# Patient Record
Sex: FEMALE | Race: WHITE
[De-identification: names, ages, dates, MRNs, and addresses within clinical notes are randomized per-mention and may not be internally consistent; named-entity substitution may affect disease eponyms.]

---

## 2018-09-25 ENCOUNTER — HOSPITAL ENCOUNTER (INPATIENT)
Dept: HOSPITAL 88 - ER | Age: 83
LOS: 3 days | Discharge: NURSING, REHAB OR LONG-TERM CARE | DRG: 65 | End: 2018-09-28
Attending: INTERNAL MEDICINE | Admitting: INTERNAL MEDICINE
Payer: MEDICARE

## 2018-09-25 VITALS — SYSTOLIC BLOOD PRESSURE: 160 MMHG | DIASTOLIC BLOOD PRESSURE: 75 MMHG

## 2018-09-25 VITALS — SYSTOLIC BLOOD PRESSURE: 163 MMHG | DIASTOLIC BLOOD PRESSURE: 94 MMHG

## 2018-09-25 VITALS — BODY MASS INDEX: 28.89 KG/M2 | WEIGHT: 143.31 LBS | HEIGHT: 59 IN

## 2018-09-25 DIAGNOSIS — I10: ICD-10-CM

## 2018-09-25 DIAGNOSIS — F32.9: ICD-10-CM

## 2018-09-25 DIAGNOSIS — G43.909: ICD-10-CM

## 2018-09-25 DIAGNOSIS — M19.90: ICD-10-CM

## 2018-09-25 DIAGNOSIS — E78.5: ICD-10-CM

## 2018-09-25 DIAGNOSIS — G81.94: ICD-10-CM

## 2018-09-25 DIAGNOSIS — Z88.0: ICD-10-CM

## 2018-09-25 DIAGNOSIS — F41.9: ICD-10-CM

## 2018-09-25 DIAGNOSIS — N31.9: ICD-10-CM

## 2018-09-25 DIAGNOSIS — I63.9: Primary | ICD-10-CM

## 2018-09-25 LAB
ALBUMIN SERPL-MCNC: 3.9 G/DL (ref 3.5–5)
ALBUMIN/GLOB SERPL: 1.4 {RATIO} (ref 0.8–2)
ALP SERPL-CCNC: 57 IU/L (ref 40–150)
ALT SERPL-CCNC: 13 IU/L (ref 0–55)
AMPHETAMINES UR QL SCN>1000 NG/ML: NEGATIVE
ANION GAP SERPL CALC-SCNC: 14.6 MMOL/L (ref 8–16)
BACTERIA URNS QL MICRO: (no result) /HPF
BASOPHILS # BLD AUTO: 0 10*3/UL (ref 0–0.1)
BASOPHILS NFR BLD AUTO: 0.5 % (ref 0–1)
BENZODIAZ UR QL SCN: POSITIVE
BILIRUB UR QL: NEGATIVE
BUN SERPL-MCNC: 20 MG/DL (ref 7–26)
BUN/CREAT SERPL: 26 (ref 6–25)
CALCIUM SERPL-MCNC: 9.4 MG/DL (ref 8.4–10.2)
CHLORIDE SERPL-SCNC: 105 MMOL/L (ref 98–107)
CK MB SERPL-MCNC: 2.4 NG/ML (ref 0–5)
CK SERPL-CCNC: 70 IU/L (ref 29–168)
CLARITY UR: CLEAR
CO2 SERPL-SCNC: 26 MMOL/L (ref 22–29)
COLOR UR: YELLOW
DEPRECATED APTT PLAS QN: 24.3 SECONDS (ref 23.8–35.5)
DEPRECATED INR PLAS: 0.9
DEPRECATED NEUTROPHILS # BLD AUTO: 4.7 10*3/UL (ref 2.1–6.9)
DEPRECATED RBC URNS MANUAL-ACNC: (no result) /HPF (ref 0–5)
EGFRCR SERPLBLD CKD-EPI 2021: > 60 ML/MIN (ref 60–?)
EOSINOPHIL # BLD AUTO: 0.1 10*3/UL (ref 0–0.4)
EOSINOPHIL NFR BLD AUTO: 1.4 % (ref 0–6)
EPI CELLS URNS QL MICRO: (no result) /LPF
ERYTHROCYTE [DISTWIDTH] IN CORD BLOOD: 13.2 % (ref 11.7–14.4)
GLOBULIN PLAS-MCNC: 2.7 G/DL (ref 2.3–3.5)
GLUCOSE SERPLBLD-MCNC: 105 MG/DL (ref 74–118)
HCT VFR BLD AUTO: 39.8 % (ref 34.2–44.1)
HGB BLD-MCNC: 13.4 G/DL (ref 12–16)
KETONES UR QL STRIP.AUTO: NEGATIVE
LEUKOCYTE ESTERASE UR QL STRIP.AUTO: (no result)
LYMPHOCYTES # BLD: 2.4 10*3/UL (ref 1–3.2)
LYMPHOCYTES NFR BLD AUTO: 30.3 % (ref 18–39.1)
MCH RBC QN AUTO: 30.9 PG (ref 28–32)
MCHC RBC AUTO-ENTMCNC: 33.7 G/DL (ref 31–35)
MCV RBC AUTO: 91.7 FL (ref 81–99)
MONOCYTES # BLD AUTO: 0.7 10*3/UL (ref 0.2–0.8)
MONOCYTES NFR BLD AUTO: 8.7 % (ref 4.4–11.3)
NEUTS SEG NFR BLD AUTO: 58.8 % (ref 38.7–80)
NITRITE UR QL STRIP.AUTO: NEGATIVE
NON-SQ EPI CELLS URNS QL MICRO: (no result)
PCP UR QL SCN: NEGATIVE
PLATELET # BLD AUTO: 227 X10E3/UL (ref 140–360)
POTASSIUM SERPL-SCNC: 3.6 MMOL/L (ref 3.5–5.1)
PROT UR QL STRIP.AUTO: NEGATIVE
PROTHROMBIN TIME: 13 SECONDS (ref 11.9–14.5)
RBC # BLD AUTO: 4.34 X10E6/UL (ref 3.6–5.1)
SODIUM SERPL-SCNC: 142 MMOL/L (ref 136–145)
SP GR UR STRIP: 1 (ref 1.01–1.02)
UROBILINOGEN UR STRIP-MCNC: 0.2 MG/DL (ref 0.2–1)
WBC #/AREA URNS HPF: (no result) /HPF (ref 0–5)

## 2018-09-25 PROCEDURE — 70544 MR ANGIOGRAPHY HEAD W/O DYE: CPT

## 2018-09-25 PROCEDURE — 82553 CREATINE MB FRACTION: CPT

## 2018-09-25 PROCEDURE — 80307 DRUG TEST PRSMV CHEM ANLYZR: CPT

## 2018-09-25 PROCEDURE — 70450 CT HEAD/BRAIN W/O DYE: CPT

## 2018-09-25 PROCEDURE — 92523 SPEECH SOUND LANG COMPREHEN: CPT

## 2018-09-25 PROCEDURE — 73521 X-RAY EXAM HIPS BI 2 VIEWS: CPT

## 2018-09-25 PROCEDURE — 93005 ELECTROCARDIOGRAM TRACING: CPT

## 2018-09-25 PROCEDURE — 85610 PROTHROMBIN TIME: CPT

## 2018-09-25 PROCEDURE — 36415 COLL VENOUS BLD VENIPUNCTURE: CPT

## 2018-09-25 PROCEDURE — 93880 EXTRACRANIAL BILAT STUDY: CPT

## 2018-09-25 PROCEDURE — 93306 TTE W/DOPPLER COMPLETE: CPT

## 2018-09-25 PROCEDURE — 70551 MRI BRAIN STEM W/O DYE: CPT

## 2018-09-25 PROCEDURE — 85025 COMPLETE CBC W/AUTO DIFF WBC: CPT

## 2018-09-25 PROCEDURE — 80061 LIPID PANEL: CPT

## 2018-09-25 PROCEDURE — 72100 X-RAY EXAM L-S SPINE 2/3 VWS: CPT

## 2018-09-25 PROCEDURE — 84484 ASSAY OF TROPONIN QUANT: CPT

## 2018-09-25 PROCEDURE — 81001 URINALYSIS AUTO W/SCOPE: CPT

## 2018-09-25 PROCEDURE — 85730 THROMBOPLASTIN TIME PARTIAL: CPT

## 2018-09-25 PROCEDURE — 83036 HEMOGLOBIN GLYCOSYLATED A1C: CPT

## 2018-09-25 PROCEDURE — 99284 EMERGENCY DEPT VISIT MOD MDM: CPT

## 2018-09-25 PROCEDURE — 80053 COMPREHEN METABOLIC PANEL: CPT

## 2018-09-25 PROCEDURE — 82550 ASSAY OF CK (CPK): CPT

## 2018-09-25 NOTE — DIAGNOSTIC IMAGING REPORT
History:Weakness, confusion. TIA

Comparison studies:None



Technique:

Axial images were obtained from the skull base to the vertex.

Coronal and sagittal images reconstructed from the axial data.

Intravenous contrast: None

Dose modulation, iterative reconstruction, and/or weight based adjustment of

the mA/kV was utilized to reduce the radiation dose to as low as reasonably

achievable.



Findings:



Scalp/skull: 

No abnormalities.



Extra-axial spaces: 

No masses.  No fluid collections.



Brain sulci: Mildly prominent.

Ventricles: Mild compensatory dilatation. No hydrocephalus.



Parenchyma: 

Scattered small hypodensities in the supratentorial white matter are small

vessel ischemic changes. No masses, hemorrhage, acute or chronic cortical

vascular insults.



Sellar/suprasellar region: No abnormalities.

Craniocervical junction: Patent foramen magnum.  No Chiari one malformation.



Incidental findings:

Atherosclerotic calcifications in the carotid siphons and vertebral artery .



Impression:



No acute abnormalities.



Chronic findings:

1.  Mild generalized volume loss.

2.  Mild supratentorial white matter small vessel ischemic changes.



Signed by: DR Satya Torres M.D. on 9/25/2018 3:49 PM

## 2018-09-26 VITALS — DIASTOLIC BLOOD PRESSURE: 81 MMHG | SYSTOLIC BLOOD PRESSURE: 129 MMHG

## 2018-09-26 VITALS — SYSTOLIC BLOOD PRESSURE: 155 MMHG | DIASTOLIC BLOOD PRESSURE: 73 MMHG

## 2018-09-26 VITALS — SYSTOLIC BLOOD PRESSURE: 160 MMHG | DIASTOLIC BLOOD PRESSURE: 75 MMHG

## 2018-09-26 VITALS — SYSTOLIC BLOOD PRESSURE: 162 MMHG | DIASTOLIC BLOOD PRESSURE: 78 MMHG

## 2018-09-26 VITALS — DIASTOLIC BLOOD PRESSURE: 88 MMHG | SYSTOLIC BLOOD PRESSURE: 145 MMHG

## 2018-09-26 VITALS — SYSTOLIC BLOOD PRESSURE: 129 MMHG | DIASTOLIC BLOOD PRESSURE: 81 MMHG

## 2018-09-26 VITALS — DIASTOLIC BLOOD PRESSURE: 70 MMHG | SYSTOLIC BLOOD PRESSURE: 156 MMHG

## 2018-09-26 VITALS — SYSTOLIC BLOOD PRESSURE: 187 MMHG | DIASTOLIC BLOOD PRESSURE: 85 MMHG

## 2018-09-26 LAB
CHOLEST SERPL-MCNC: 208 MD/DL (ref 0–199)
CHOLEST/HDLC SERPL: 3.4 {RATIO} (ref 3–3.6)
HDLC SERPL-MSCNC: 62 MG/DL (ref 40–60)
LDLC SERPL CALC-MCNC: 123 MG/DL (ref 60–130)
TRIGL SERPL-MCNC: 114 MG/DL (ref 0–149)

## 2018-09-26 RX ADMIN — VERAPAMIL HYDROCHLORIDE SCH MG: 120 TABLET, FILM COATED, EXTENDED RELEASE ORAL at 14:36

## 2018-09-26 RX ADMIN — FAMOTIDINE SCH MG: 20 TABLET, FILM COATED ORAL at 16:54

## 2018-09-26 RX ADMIN — Medication SCH MG: at 08:58

## 2018-09-26 RX ADMIN — TOLTERODINE TARTRATE SCH MG: 4 CAPSULE, EXTENDED RELEASE ORAL at 11:23

## 2018-09-26 RX ADMIN — ENOXAPARIN SODIUM SCH MG: 40 INJECTION SUBCUTANEOUS at 16:54

## 2018-09-26 NOTE — DIAGNOSTIC IMAGING REPORT
Exams: Brain MRI and intracranial MRA without IV contrast

History: Evaluate for stroke, weakness, facial droop, difficulty standing.

Comparison studies: Head CT 9/20 5/20/2018



Technique:

Brain: Axial DWI, axial and coronal T2 FLAIR, axial T1 FLAIR, axial and

sagittal T2 FS, and axial T2*GRE.

Intracranial MRA: Axial 3-D time-of-flight with coronal, sagittal and 3-D MIP

reformats.

Intravenous contrast: None



Findings:



Brain:



Scalp: No abnormal signal.  No masses.

Bone marrow: Normal in signal intensity.



Brain sulci: Mildly prominent.

Ventricles: Mild compensatory dilatation.

Extra axial spaces: No mass, no fluid collection.



Parenchyma:

Acute, nonhemorrhagic ischemic insult with linear restricted diffusion

associated T2 FLAIR hyperintensity extends from the posterior right putamen to

the posterior limb of the right internal capsule thalamocapsular region.

Scattered and mildly confluent T2 FLAIR hyperintense signal changes in the

supratentorial white matter are nonspecific most compatible with chronic

microvascular ischemic changes.



Suprasellar region: No abnormalities.

Craniocervical junction: No abnormalities. The foramen magnum is patent. No

Chiari malformations.

Vessels: Normal flow-voids in the arteries and sinuses.

Incidental findings: Bilateral lateral lens replacement related previous

cataract surgery.



Intracranial MRA:



Anterior circulation: 

Patent bilateral internal carotid arteries with calcified after cirrhosis in

the carotid siphons without significant stenosis. Patent, no flow abnormalities

in the bilateral M1 and proximal M2 segments of the middle vertebral arteries

and A1 and proximal A2 segments of the anterior cerebral arteries.



Vertebrobasilar circulation:

Patent, no flow abnormalities in the included intradural V4 segments of the

vertebral arteries, basilar artery and posterior cerebral arteries. The right

P1 segment is hypoplastic and the dominant supply to the right PCA is via a

patent right posterior commuting artery (persistent fetal origin. Slightly

greater flow signal in the left posterior cervical artery compared to the

right.



Anatomical variants:

Acom: Patent.

Pcom: Persistent right PCA fetal origin. Hypoplastic left.

Vertebral arteries:Codominant.



IMPRESSION:



Brain MRI:

1.  Small acute nonhemorrhagic ischemic insult extends from the posterior right

putamen to the posterior limb of the right internal capsule.

2.  Moderate chronic microvascular ischemic changes.

3.  Mild generalized volume loss.



Intracranial MRA:

1.  No major branch occlusion or hemodynamically significant stenosis in the

[place].

2.  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

3.  Anatomical variant right PCA fetal origin with slightly greater flow signal

in the contralateral left PCA compared to the right.



Findings discussed with Nurse Sheets at the 12:27 PM on 9/26/2018.

    



Signed by: Dr. Tre Duval M.D. on 9/26/2018 12:28 PM

## 2018-09-26 NOTE — HISTORY AND PHYSICAL
CHIEF COMPLAINT:  Possible TIA and possible ischemic stroke.



HISTORY:  An 87-year-old female 2 days ago had some slurred speech that 

completely resolved. She came to my office subsequently within 24 hours, 

and the patient is asymptomatic.  She was ordered multiple tests.  Imaging 

done and also gave the patient baby aspirin.  However, yesterday the 

patient on the day of presentation she complained of some left-sided 

weakness to the lower extremities and some slurred speech.  The patient was 

immediately brought into the emergency room.  Currently, the patient is 

stable.  She had no slurred speech.  She has some weakness on the left 

upper extremity, but she is able to walk.  The patient is otherwise stable 

at this time.  Neurology consultation obtained.  CT scan of the brain 

showed no acute bleed or finding.  The patient is pending for further 

evaluation.



PAST MEDICAL HISTORY:  Depression, anxiety, osteoarthritis, hypertension.



PAST SURGICAL HISTORY:  Noncontributory.



SOCIAL HISTORY:  The patient lives at home by herself.  She is very 

functional.



ALLERGIES:  CODEINE AND PENICILLIN.  



HOME MEDICATIONS:  Celebrex, Detrol LA and verapamil.



REVIEW OF SYSTEMS:  Left-sided upper extremity weakness. 



PHYSICAL EXAMINATION

VITAL SIGNS:  Temperature is 97, blood pressure 187/85, pulse rate is 77, 

respirations 18. 

GENERAL:  The patient is not in acute distress. 

HEENT:  Normocephalic, atraumatic and anicteric. 

NECK:  Supple grossly.  

PULMONARY:  Diminished breath sounds. 

CARDIOVASCULAR:  S1 and S2.  Regular rate and rhythm. 

ABDOMEN:  Soft.  

EXTREMITIES:  No gross cyanosis or edema. 

NEUROLOGIC:  There is some left-sided weakness in the left upper extremity. 

 There is some slight weakness on the left lower extremity.  Speech is 

normal. 



LABORATORY:  Sodium 142, potassium 3.6, chloride 105, bicarb 26, BUN 20, 

creatinine 0.7, glucose 105.  WBC 7.9, hemoglobin 13.4, hematocrit 39.8, 

and platelets is 227,000.



IMPRESSION 

1.  Most likely ischemic cerebrovascular accident.

2.  Elevated blood pressure, but will maintain blood pressure elevation.



PLAN:  Continue with aspirin and add on Lovenox.  Keep blood pressure 

permissive elevation.  Consultation with Dr. Taylor Estevez.  MRI and MRA 

of the brain today.  Carotid Doppler.









DD:  09/26/2018 09:17

DT:  09/26/2018 09:42

Job#:  [id number]

## 2018-09-26 NOTE — CONSULTATION
DATE OF CONSULTATION:  2018 



NEUROLOGY CONSULT



HISTORY OF PRESENT ILLNESS:  Ms. Mahmood is an 87-year-old right-hand 

dominant woman with a past medical history significant for hypertension 

admitted to [facility] on 2018, with symptoms 

suspicious for a stroke.



On the evening of 2018, the patient was noted to have slurred 

speech and left facial droop by her children.  The time of onset and 

duration of these symptoms is unknown.  Ms. Mahmood saw her primary care 

physician, Dr. Mondragon, on 2018.  After describing her symptoms 

to Dr. Mondragon, the patient was informed she probably had experienced a 

transient ischemic attack.  Dr. Mondragon ordered several diagnostic studies for 

evaluation, all to be performed as an outpatient.



On the morning of 2018, the patient awoke with slurred 

speech, left facial droop, left hemiparesis, and impairment of balance and 

gait, probably secondary to left leg weakness.  Ms. Mahmood does not 

report a visual field cut or other disturbance, aphasia, numbness, 

dizziness, or confusion.  Despite the presence of the above symptoms, the 

patient arose from her bed and ambulated towards the kitchen to prepare 

breakfast.  However, Ms. Mahmood experienced a fall.  At this time, the 

patient alerted her primary care physician of her symptoms.  Ms. Mahmood 

was told to proceed directly to the emergency center at [facility] for further evaluation of her symptoms.



Upon arrival in the emergency center, the patient was afebrile with a blood 

pressure of 177/85 mmHg and pulse of 84 beats per minute.  The patient's 

neurological examination was significant for left facial droop, left facial 

weakness, weakness of the left arm and left leg.  No other deficits were 

noted.  CT of brain without contrast was performed while the patient was in 

the emergency center.  There is no evidence of recent large territorial 

ischemia or hemorrhage.  Ms. Mahmood 

was admitted to [facility] as an inpatient for further 

evaluation and treatment of her symptoms.



Ms. Mahmood does not report experiencing similar symptoms previously.  She 

does not take antiplatelet or anticoagulant medications at home.



REVIEW OF SYSTEMS:  Dysarthria, left facial weakness, weakness of the left 

arm and leg, impairment of balance and gait.  Otherwise, the 12-point 

review of systems is negative.



PAST MEDICAL HISTORY:  Hypertension, osteoarthritis, prior history of 

migraines, neurogenic bladder.



PAST SURGICAL HISTORY:  Cholecystectomy/appendectomy, hysterectomy, foot 

surgery times 4, bilateral cataract removal.



PAST HOSPITALIZATIONS:  Surgeries/procedures as listed, childbirth times 3.



FAMILY MEDICAL HISTORY:  The patient's paternal and maternal grandparents 

are . Their medical histories are unknown.  The patient's father is 

 from a motor vehicle accident.  Her mother is  from 

congestive heart failure.  Ms. Mahmood had 4 brothers, all of whom are 

.  Two brothers had diabetes mellitus.  One brother  from a 

stroke.  A 4th brother  from natural causes/old age.  Ms. Mahmood has 

3 children, 1 son and 2 daughters, all of whom are living.  The patient's 

son has diabetes mellitus, type 2.  Her daughters are healthy.



SOCIAL HISTORY:  Ms. Mahmood is a .  She is retired.  The patient 

does not report current or prior tobacco or recreational drug use.  She 

does report drinking an occasional glass of wine.



HOME MEDICATIONS

1.  Acetaminophen/diphenhydramine 1 tablet by mouth at bedtime daily.

2.  Alprazolam 0.25 mg by mouth every 6 hours as needed for anxiety.

3.  Celebrex 200 mg by mouth daily as needed for pain.

4.  Detrol LA 4 mg by mouth daily.

5.  Verapamil  mg by mouth daily.



ALLERGIES:  PENICILLIN, CODEINE, MORPHINE, HYDROCODONE, OXYCODONE.  NO 

KNOWN FOOD ALLERGIES.  NO KNOWN ALLERGIES TO LATEX.  NO KNOWN ALLERGIES TO 

IODINE OR OTHER CONTRAST MATERIALS.  



PHYSICAL EXAMINATION:   

VITAL SIGNS:  Height 59 inches, weight 143 pounds, BMI 28.9 kg per meter 

squared.  Blood pressure 162/78 mmHg, pulse 76 beats per minute, 

respiratory rate 12 breaths per minute, oxygen saturation 97% on room air. 

GENERAL:  The patient is awake and alert.  Does not appear distressed.  

Overweight.  

HEENT:  Normocephalic and atraumatic.  Pupils are surgical.  Moist mucous 

membranes. 

NECK:  Supple.  No appreciable thyromegaly.  No appreciable carotid bruits. 

CARDIOVASCULAR:  S1 and S2.  Regular rate and rhythm.  No murmurs, rubs, or 

gallops. 

RESPIRATORY:  Clear to auscultation bilaterally.  No wheezes, rhonchi or 

rales.  

EXTREMITIES:  The skin is warm and dry.  No clubbing, cyanosis or edema.  

The posterior tibial and dorsalis pedis pulses are 2+ and symmetric.  

SKIN:  No rashes or lesions.  

NEUROLOGIC:  Memory/attention:  The patient is awake and alert.  Oriented 

to person, place, time, and situation.  

CRANIAL NERVES:  Cranial nerve 1 not tested.  Cranial nerve II, III, IV, 

VI:  Pupils are surgical.  Extraocular movements intact.  No nystagmus.  

Cranial nerve V:  Sensation to light touch and pinprick is intact in the 

bilateral V1 through V3 distributions.  Strength of the temporalis and 

masseter muscles is within normal limits.  Cranial nerve VII:  There is 

flattening of the left nasal labial fold.  There is mild essential left 

facial weakness.  Cranial nerve VIII:  Hearing is diminished to finger rub 

on the right.  Cranial nerves IX and X:  The soft palate elevates equally 

and symmetrically.  Cranial nerve XI:  Normal strength of the bilateral 

sternocleidomastoid and trapezius muscles.  Cranial nerve XII:  The tongue 

protrudes midline and moves symmetrically from side to side.  

STRENGTH:  Bulk is normal.  Strength is 5/5 in the right deltoid, biceps, 

triceps, wrist flexors and extensors, finger flexors and extensors, 

intrinsic hand muscles, hip flexors, knee flexors and extensors, ankle 

dorsiflexion and plantar flexion, and intrinsic foot muscles.  Tone is 

normal in the right arm and leg.  Strength in the left deltoid is 4/5.  

Left biceps 4+/5, left triceps 4/5, left wrist flexors 4+/5, left wrist 

extensors 4/5, left finger flexors 4+/5, left finger extensors 4/5, left 

intrinsic hand muscles are 4/5, left hip flexors, 4+/5, left knee flexors 

4+/5, left knee extensors 5/5, left ankle dorsiflexion 4+/5, left ankle 

plantar flexion 5/5, and left intrinsic foot muscles 5/5.  Tone is mildly 

decreased in the left arm and left leg.  

DTRs:  Are 2+ on the right and 3+ on the left triceps, biceps, and 

brachioradialis.  Deep tendon reflexes are 1+ and symmetric at the 

patellas.  Deep tendon reflexes are absent and symmetric at the Achilles.  

Plantar responses are flexor bilaterally.  

SENSATION:  Intact to light touch and pinprick in both arms and both legs.  

CEREBELLAR:  Finger-nose-finger and heel-shin movements are intact without 

dysmetria or other impairment except as follows:  There is dysmetria on 

finger-nose-finger movements of the left arm, but this is within the bounds 

of paresis.  

GAIT:  Deferred.  

SPEECH:  Spontaneous speech is mildly dysarthric without aphasia.  

Repetition is intact.  

INVOLUNTARY MOVEMENTS:  None.  

PRONATOR DRIFT:  Subtle in the left arm.  



LABORATORY DATA:  The patient's comprehensive metabolic panel is 

unremarkable.  Cardiac enzymes are negative times 1.  The CBC with 

differential and platelets is unremarkable.  PT, INR, and PTT are within 

normal limits.  A urinalysis is significant for a specific gravity of 

1.005, trace leukocyte esterase, and moderate transitional epithelial 

cells.  A urine drug screen was positive for benzodiazepines.



DIAGNOSTIC STUDIES:  Electrocardiogram on 2018, normal sinus 

rhythm at 85 beats per minute.  CT of the brain without contrast on 

2018, on my review, there is no evidence of recent large 

territorial ischemia, hemorrhage, mass, or mass effect.  There is diffuse 

cerebral atrophy, slightly more prominent over the bilateral temporal 

lobes.  There are findings compatible with mild to moderate chronic small 

vessel ischemic disease.  MRI of the brain without contrast on 2018, on my review, there is an acute ischemic lacunar infarct of the 

posterior limb of the internal capsule on the right.  No chronic infarcts 

are observed. Once again, there is diffuse cerebral atrophy, slightly more 

prominent at the bilateral temporal lobes.  There are scattered, 

nonspecific T2/flair hyperintense foci of the supratentorial deep white 

matter compatible with moderate chronic small vessel ischemic disease.  MRA 

of the brain without contrast on 2018, there is no 

hemodynamically significant stenosis of the intracranial vessels.  

Echocardiogram on 2018, ejection fraction 55% to 60%.  

Concentric left ventricular hypertrophy.  Mild aortic and pulmonic 

insufficiency, mitral and tricuspid regurgitation.  Bilateral carotid 

artery ultrasound with Doppler on 2018, there is no evidence 

of atherosclerosis in either carotid artery system.  Flow is antegrade in 

the bilateral vertebral arteries.



ASSESSMENT AND PLAN:  Ms. Mahmood is an 87-year-old right-hand dominant 

woman with a past medical history significant for hypertension, admitted 

with an acute ischemic lacunar stroke in the posterior limb of the internal 

capsule on the right.  The patient's neurological examination is 

significant for mild dysarthria, flattening of the left nasal labial fold 

with mild central left facial weakness, mild left hemiparesis with 

decreased tone in the left arm and left leg, and dysmetria of the left arm 

within the bounds of paresis.  Patient's laboratory data and other 

diagnostic studies have been reviewed and are documented above.



RECOMMENDATIONS

1.  A lipid panel and hemoglobin A1c will be ordered.  

2.  Continue aspirin 325 mg by mouth daily for stroke prophylaxis.  

3.  Patient's blood pressures may be gradually normalized.  Her goal blood 

pressure is less than 140/90 mmHg prior to discharge.  Treatment with the 

patient's home blood pressure medication of verapamil ER will be resumed, 

but the dose will be reduced to 120 mg by mouth daily initially.

4.  Follow up the results of the lip panel.  The patient's goal total 

cholesterol less than 200 with a LDL of less than 70.  

5.  Follow up the results of the hemoglobin A1c.  The patient's goal 

hemoglobin A1c is less than 7.0.  Tight glycemic control is recommended 

while the patient is in the hospital.  

6.  Speech and physical therapy evaluations will be ordered.  

7.  GI prophylaxis with Pepcid 20 mg by mouth twice daily with meals.  DVT 

prophylaxis with Lovenox 40 mg subcutaneously daily.  

8.  Discontinue treatment with Xanax.  Ms. Mahmood will be prescribed 

trazodone 25 mg by mouth at bedtime daily for insomnia.  

9.  Defer treatment of the remaining medical comorbidities to the primary 

and other services following the patient.



Thank you for this consultation.  I will continue to follow the patient 

while she remains in the hospital.



Time spent 70 minutes.



 





DD:  2018 14:13

DT:  2018 14:55

Job#:  [id number] GLORY SCHREIBER

## 2018-09-26 NOTE — DIAGNOSTIC IMAGING REPORT
Exams: Brain MRI and intracranial MRA without IV contrast

History: Evaluate for stroke, weakness, facial droop, difficulty standing.

Comparison studies: Head CT 9/20 5/20/2018



Technique:

Brain: Axial DWI, axial and coronal T2 FLAIR, axial T1 FLAIR, axial and

sagittal T2 FS, and axial T2*GRE.

Intracranial MRA: Axial 3-D time-of-flight with coronal, sagittal and 3-D MIP

reformats.

Intravenous contrast: None



Findings:



Brain:



Scalp: No abnormal signal.  No masses.

Bone marrow: Normal in signal intensity.



Brain sulci: Mildly prominent.

Ventricles: Mild compensatory dilatation.

Extra axial spaces: No mass, no fluid collection.



Parenchyma:

Acute, nonhemorrhagic ischemic insult with linear restricted diffusion

associated T2 FLAIR hyperintensity extends from the posterior right putamen to

the posterior limb of the right internal capsule thalamocapsular region.

Scattered and mildly confluent T2 FLAIR hyperintense signal changes in the

supratentorial white matter are nonspecific most compatible with chronic

microvascular ischemic changes.



Suprasellar region: No abnormalities.

Craniocervical junction: No abnormalities. The foramen magnum is patent. No

Chiari malformations.

Vessels: Normal flow-voids in the arteries and sinuses.

Incidental findings: Bilateral lateral lens replacement related previous

cataract surgery.



Intracranial MRA:



Anterior circulation: 

Patent bilateral internal carotid arteries with calcified after cirrhosis in

the carotid siphons without significant stenosis. Patent, no flow abnormalities

in the bilateral M1 and proximal M2 segments of the middle vertebral arteries

and A1 and proximal A2 segments of the anterior cerebral arteries.



Vertebrobasilar circulation:

Patent, no flow abnormalities in the included intradural V4 segments of the

vertebral arteries, basilar artery and posterior cerebral arteries. The right

P1 segment is hypoplastic and the dominant supply to the right PCA is via a

patent right posterior commuting artery (persistent fetal origin. Slightly

greater flow signal in the left posterior cervical artery compared to the

right.



Anatomical variants:

Acom: Patent.

Pcom: Persistent right PCA fetal origin. Hypoplastic left.

Vertebral arteries:Codominant.



IMPRESSION:



Brain MRI:

1.  Small acute nonhemorrhagic ischemic insult extends from the posterior right

putamen to the posterior limb of the right internal capsule.

2.  Moderate chronic microvascular ischemic changes.

3.  Mild generalized volume loss.



Intracranial MRA:

1.  No major branch occlusion or hemodynamically significant stenosis in the

[place].

2.  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

3.  Anatomical variant right PCA fetal origin with slightly greater flow signal

in the contralateral left PCA compared to the right.



Findings discussed with Nurse Sheets at the 12:27 PM on 9/26/2018.

    



Signed by: Dr. Tre Duval M.D. on 9/26/2018 12:28 PM

## 2018-09-27 VITALS — SYSTOLIC BLOOD PRESSURE: 117 MMHG | DIASTOLIC BLOOD PRESSURE: 83 MMHG

## 2018-09-27 VITALS — SYSTOLIC BLOOD PRESSURE: 178 MMHG | DIASTOLIC BLOOD PRESSURE: 72 MMHG

## 2018-09-27 VITALS — SYSTOLIC BLOOD PRESSURE: 177 MMHG | DIASTOLIC BLOOD PRESSURE: 83 MMHG

## 2018-09-27 VITALS — SYSTOLIC BLOOD PRESSURE: 172 MMHG | DIASTOLIC BLOOD PRESSURE: 72 MMHG

## 2018-09-27 VITALS — DIASTOLIC BLOOD PRESSURE: 68 MMHG | SYSTOLIC BLOOD PRESSURE: 155 MMHG

## 2018-09-27 VITALS — SYSTOLIC BLOOD PRESSURE: 195 MMHG | DIASTOLIC BLOOD PRESSURE: 83 MMHG

## 2018-09-27 VITALS — SYSTOLIC BLOOD PRESSURE: 154 MMHG | DIASTOLIC BLOOD PRESSURE: 68 MMHG

## 2018-09-27 RX ADMIN — FAMOTIDINE SCH MG: 20 TABLET, FILM COATED ORAL at 16:48

## 2018-09-27 RX ADMIN — TOLTERODINE TARTRATE SCH MG: 4 CAPSULE, EXTENDED RELEASE ORAL at 08:28

## 2018-09-27 RX ADMIN — Medication SCH MG: at 08:28

## 2018-09-27 RX ADMIN — VERAPAMIL HYDROCHLORIDE SCH MG: 120 TABLET, FILM COATED, EXTENDED RELEASE ORAL at 08:28

## 2018-09-27 RX ADMIN — FAMOTIDINE SCH MG: 20 TABLET, FILM COATED ORAL at 07:30

## 2018-09-27 RX ADMIN — TRAZODONE HYDROCHLORIDE SCH MG: 50 TABLET ORAL at 21:00

## 2018-09-27 RX ADMIN — TRAMADOL HYDROCHLORIDE PRN MG: 50 TABLET, FILM COATED ORAL at 21:00

## 2018-09-27 RX ADMIN — ENOXAPARIN SODIUM SCH MG: 40 INJECTION SUBCUTANEOUS at 16:48

## 2018-09-27 RX ADMIN — TRAMADOL HYDROCHLORIDE PRN MG: 50 TABLET, FILM COATED ORAL at 09:56

## 2018-09-27 RX ADMIN — Medication SCH MG: at 21:00

## 2018-09-27 NOTE — DIAGNOSTIC IMAGING REPORT
Radiographs of the lumbar spine - 3 views



HISTORY:  Pain

COMPARISON: None available.

     

FINDINGS:

Bones:

No acute displaced fracture.  

Osseous alignment is within normal limits.



Joints:

Scattered degenerative change. No osseous erosion.



Mild grade 1 anterolisthesis of L5 with respect S1. L5 pars interarticularis

defects.



Soft tissues:

The soft tissues appear unremarkable.





IMPRESSION: 

Scattered degenerative change. No osseous erosion.



Mild grade 1 anterolisthesis of L5 with respect S1. L5 pars interarticularis

defects.



Signed by: Dr. John Morrison M.D. on 9/27/2018 11:50 AM

## 2018-09-27 NOTE — CONSULTATION
DATE OF CONSULTATION:  September 27, 2018 



REHAB CONSULTATION



REFERRING PHYSICIAN:  Dr. Charlie Mondragon.



I would like to thank Dr. Mondragon For asking me to see Mrs. Mahmood in 

consultation.



REASON FOR CONSULTATION

1. CVA with left-sided hemiparesis.

2. History of hypertension.

3. History of migraine headaches and neurogenic bladder.



HISTORY:  The patient is an 87-year-old female who was admitted to [facility] on September 25, 2018, with suspicion of stroke. Two days 

ago the patient was having a TIA type of symptoms and was feeling much 

better by the next day. She had seen Dr. Mondragon and underwent outpatient 

workup. However, by the next day the patient had left-sided facial droop 

and left-sided weakness. She also had a fall and then was admitted to the 

hospital for further care. Patient came in, found to have elevated blood 

pressure. CT scan was essentially negative, but MRIs showed an acute 

ischemic lacunar infarct in the posterior limb of the internal capsule on 

the right. I am being asked to evaluate for rehab needs. Patient yesterday 

had a fall, tried to get up with assistance, turned the wrong way and fell. 

She had an x-ray of her hip and pelvis, showed scattered degenerative 

changes but no osseous erosion, soft tissues were unremarkable, no 

fractures. Lumbar spine x-ray showed scattered degenerative changes, mild 

grade 1 anterior listhesis of L5 with respect to S1. 



PAST MEDICAL HISTORY:  Includes hypertension, OA, migraines, neurogenic 

bladder.



SURGERIES:  Include cholecystectomy, appendectomy, hysterectomy, foot 

surgery x4, bilateral cataract removal.



FAMILY HISTORY:  Positive for CHF in her mother. Two brothers had diabetes.



SOCIAL HISTORY:  Lives by herself in a one-story home, was otherwise 

ambulatory, getting around without any difficulty. Retired. 



HABITS:  Nonsmoker, occasional drinker of wine once in a great while.



ALLERGIES:  PENICILLIN, CODEINE, MORPHINE, HYDROCODONE, OXYCODONE. NO KNOWN 

FOOD ALLERGIES.



LABS:  White cell 7.9, hemoglobin 13.4, hematocrit 39.8, platelets of 227. 

Sodium is 142, potassium 3.5, BUN 20, creatinine 0.78.



PHYSICAL EXAMINATION

GENERAL:  Patient is awake and alert. Family is at the bedside. Oriented 

x3. No apparent distress. Has some left-sided facial droop. No sensory 

changes.

EYES:  Gaze is conjugate. No visual field deficits.

ORAL:  Tongue is midline.

NECK:  Supple, nontender.

HEART:  Regular.

LUNGS:  Fair entry. 

ABDOMEN:  Nontender, nondistended.

EXTREMITIES:  Functional range of motion of right arm and right leg. She 

had some weakness in the left arm and left leg. Again sensory, no numbness 

or tingling in hands, feet or face. Clonus negative bilaterally. No 

increased tone with passive range of motion in legs or arms. Manual muscle 

testing demonstrates 5/5 strength right arm and right leg throughout. In 

the left upper extremity, demonstrates 4-/5 to 4/5 strength in the left 

arm, and left leg was 4/5 strength. 



Rehab-wise, patient ambulated with therapy, demonstrating walked about 50 

feet with a rolling walker, min-mod assist. Is unsteady on her feet. She 

does not use the left upper extremity very well. Not complaining of any 

swallowing issues when she is sitting up eating.



IMPRESSION

1. Acute right lacunar cerebrovascular accident with left-sided 

weakness.

2. Hypertension.

3. Patient with osteoarthritis.

4. History of neurogenic bladder.



PLAN:   Patient would make an excellent inpatient rehab candidate. Will 

check with her insurance to see if they will allow her to come to rehab. 



PRECAUTIONS:  Falls.



Thank you once again for allowing me to participate in the care of this 

pleasant but unfortunate patient. 



 





DD:  09/27/2018 17:23

DT:  09/27/2018 17:47

Job#:  [id number] [email or web address]

## 2018-09-27 NOTE — DIAGNOSTIC IMAGING REPORT
Radiographs of the hips and pelvis - 5 views



HISTORY:  Pain

COMPARISON: None available.

     

FINDINGS:

Bones:

No acute displaced fracture.  

Osseous alignment is within normal limits.



Joints:

Scattered degenerative change. No osseous erosion.



Soft tissues:

The soft tissues appear unremarkable.





IMPRESSION: 

Scattered degenerative change. No osseous erosion.



Signed by: Dr. John Morrison M.D. on 9/27/2018 11:48 AM

## 2018-09-28 VITALS — SYSTOLIC BLOOD PRESSURE: 149 MMHG | DIASTOLIC BLOOD PRESSURE: 64 MMHG

## 2018-09-28 VITALS — DIASTOLIC BLOOD PRESSURE: 63 MMHG | SYSTOLIC BLOOD PRESSURE: 142 MMHG

## 2018-09-28 VITALS — DIASTOLIC BLOOD PRESSURE: 63 MMHG | SYSTOLIC BLOOD PRESSURE: 134 MMHG

## 2018-09-28 VITALS — SYSTOLIC BLOOD PRESSURE: 117 MMHG | DIASTOLIC BLOOD PRESSURE: 56 MMHG

## 2018-09-28 VITALS — DIASTOLIC BLOOD PRESSURE: 61 MMHG | SYSTOLIC BLOOD PRESSURE: 144 MMHG

## 2018-09-28 VITALS — SYSTOLIC BLOOD PRESSURE: 119 MMHG | DIASTOLIC BLOOD PRESSURE: 56 MMHG

## 2018-09-28 RX ADMIN — FAMOTIDINE SCH MG: 20 TABLET, FILM COATED ORAL at 17:03

## 2018-09-28 RX ADMIN — Medication SCH MG: at 10:00

## 2018-09-28 RX ADMIN — TRAZODONE HYDROCHLORIDE SCH MG: 50 TABLET ORAL at 21:00

## 2018-09-28 RX ADMIN — CELECOXIB SCH MG: 100 CAPSULE ORAL at 17:03

## 2018-09-28 RX ADMIN — HYDROCODONE BITARTRATE AND ACETAMINOPHEN PRN EA: 7.5; 325 TABLET ORAL at 18:27

## 2018-09-28 RX ADMIN — CELECOXIB SCH MG: 100 CAPSULE ORAL at 13:16

## 2018-09-28 RX ADMIN — ENOXAPARIN SODIUM SCH MG: 40 INJECTION SUBCUTANEOUS at 17:03

## 2018-09-28 RX ADMIN — HYDROCODONE BITARTRATE AND ACETAMINOPHEN PRN EA: 7.5; 325 TABLET ORAL at 10:40

## 2018-09-28 RX ADMIN — FAMOTIDINE SCH MG: 20 TABLET, FILM COATED ORAL at 10:00

## 2018-09-28 RX ADMIN — TOLTERODINE TARTRATE SCH MG: 4 CAPSULE, EXTENDED RELEASE ORAL at 10:00

## 2018-09-28 RX ADMIN — TRAMADOL HYDROCHLORIDE PRN MG: 50 TABLET, FILM COATED ORAL at 07:12

## 2018-09-28 RX ADMIN — Medication SCH MG: at 21:41
